# Patient Record
Sex: FEMALE | Race: WHITE | NOT HISPANIC OR LATINO | Employment: OTHER | URBAN - METROPOLITAN AREA
[De-identification: names, ages, dates, MRNs, and addresses within clinical notes are randomized per-mention and may not be internally consistent; named-entity substitution may affect disease eponyms.]

---

## 2022-03-01 ENCOUNTER — NEW PATIENT (OUTPATIENT)
Dept: URBAN - METROPOLITAN AREA CLINIC 26 | Facility: CLINIC | Age: 77
End: 2022-03-01

## 2022-03-01 VITALS
HEART RATE: 76 BPM | DIASTOLIC BLOOD PRESSURE: 102 MMHG | HEIGHT: 63 IN | BODY MASS INDEX: 28.7 KG/M2 | SYSTOLIC BLOOD PRESSURE: 156 MMHG | WEIGHT: 162 LBS

## 2022-03-01 DIAGNOSIS — H35.3221: ICD-10-CM

## 2022-03-01 DIAGNOSIS — H35.62: ICD-10-CM

## 2022-03-01 DIAGNOSIS — H35.373: ICD-10-CM

## 2022-03-01 DIAGNOSIS — H35.3112: ICD-10-CM

## 2022-03-01 DIAGNOSIS — H43.393: ICD-10-CM

## 2022-03-01 DIAGNOSIS — H35.363: ICD-10-CM

## 2022-03-01 DIAGNOSIS — H04.123: ICD-10-CM

## 2022-03-01 DIAGNOSIS — H35.443: ICD-10-CM

## 2022-03-01 PROCEDURE — 92134 CPTRZ OPH DX IMG PST SGM RTA: CPT

## 2022-03-01 PROCEDURE — 92235 FLUORESCEIN ANGRPH MLTIFRAME: CPT

## 2022-03-01 PROCEDURE — 67028 INJECTION EYE DRUG: CPT

## 2022-03-01 PROCEDURE — 99204 OFFICE O/P NEW MOD 45 MIN: CPT

## 2022-03-01 ASSESSMENT — VISUAL ACUITY
OD_SC: 20/20-1
OS_SC: 20/200
OS_PH: 20/40-1

## 2022-03-01 ASSESSMENT — TONOMETRY
OD_IOP_MMHG: 09
OS_IOP_MMHG: 10

## 2022-03-01 NOTE — PATIENT DISCUSSION
DISCUSSED THE DX, IMAGES, PROGNOSIS AND TX PLAN. PT REQUIRES TX, RISK OF VL: MOD/HIGH. ALL QUESTIONS WERE ANSWERED.

## 2022-03-01 NOTE — PROCEDURE NOTE: CLINICAL
PROCEDURE NOTE: Eylea PFS OS. Diagnosis: Neovascular AMD with Active CNV. Anesthesia: Lidocaine 4%. Prep: Betadine Drops. Prior to injection, risks/benefits/alternatives discussed including but not limited to infection, loss of vision or eye, hemorrhage, cataract, glaucoma, retinal tears or detachment. The patient wished to proceed with treatment. Betadine prep was performed. Topical anesthesia was induced with Alcaine. Additional anesthesia was achieved using drop(s) or injection checked above. A drop of Povidone-iodine 5% ophthalmic solution was instilled over the injection site and in the inferior fornix. A single use prefilled syringe of intravitreal Eylea 2mg/0.05ml was used and excess was disposed of as waste. The needle was passed 3.0 mm posterior to the limbus in pseudophakic patients, and 3.5 mm posterior to the limbus in phakic patients. Injection time: 436PM.  Patient tolerated procedure well. There were no complications. The eye was irrigated with sterile irrigating solution. Post procedure instructions given. The patient was instructed to use Artificial Tears q.i.d. p.r.n for comfort. The patient was instructed to return for re-evaluation in approximately 4-12 weeks depending on his/her condition and was told to call immediately if vision decreases and/or if his/her eye becomes red, painful, and/or light sensitive. The patient was instructed to go to the emergency room or call 911 if unable to reach the doctor within an hour or two of trying or calling. Geni Horvath

## 2022-04-06 ENCOUNTER — CLINIC PROCEDURE ONLY (OUTPATIENT)
Dept: URBAN - METROPOLITAN AREA CLINIC 26 | Facility: CLINIC | Age: 77
End: 2022-04-06

## 2022-04-06 DIAGNOSIS — H35.373: ICD-10-CM

## 2022-04-06 DIAGNOSIS — H35.3221: ICD-10-CM

## 2022-04-06 DIAGNOSIS — H35.3112: ICD-10-CM

## 2022-04-06 PROCEDURE — 92134 CPTRZ OPH DX IMG PST SGM RTA: CPT

## 2022-04-06 PROCEDURE — 67028 INJECTION EYE DRUG: CPT

## 2022-04-06 ASSESSMENT — VISUAL ACUITY
OS_SC: 20/100-1
OS_PH: 20/25-2

## 2022-04-06 ASSESSMENT — TONOMETRY: OS_IOP_MMHG: 11

## 2022-04-06 NOTE — PATIENT DISCUSSION
POST INJECTION EVALUATION, no signs of new infection, tear, RD, VF, EOM, CNS, Vascular or other problems or side effect from previous injection(s). no

## 2022-04-06 NOTE — PROCEDURE NOTE: CLINICAL
PROCEDURE NOTE: Eylea PFS OS. Diagnosis: Neovascular AMD with Active CNV. Anesthesia: Lidocaine 4%. Prep: Betadine Drops. Prior to injection, risks/benefits/alternatives discussed including but not limited to infection, loss of vision or eye, hemorrhage, cataract, glaucoma, retinal tears or detachment. The patient wished to proceed with treatment. Betadine prep was performed. Topical anesthesia was induced with Alcaine. Additional anesthesia was achieved using drop(s) or injection checked above. A drop of Povidone-iodine 5% ophthalmic solution was instilled over the injection site and in the inferior fornix. A single use prefilled syringe of intravitreal Eylea 2mg/0.05ml was used and excess was disposed of as waste. The needle was passed 3.0 mm posterior to the limbus in pseudophakic patients, and 3.5 mm posterior to the limbus in phakic patients. Injection time: 257PM.  Patient tolerated procedure well. There were no complications. The eye was irrigated with sterile irrigating solution. Post procedure instructions given. The patient was instructed to use Artificial Tears q.i.d. p.r.n for comfort. The patient was instructed to return for re-evaluation in approximately 4-12 weeks depending on his/her condition and was told to call immediately if vision decreases and/or if his/her eye becomes red, painful, and/or light sensitive. The patient was instructed to go to the emergency room or call 911 if unable to reach the doctor within an hour or two of trying or calling. Beverly Montesinos

## 2022-04-11 ENCOUNTER — EMERGENCY VISIT (OUTPATIENT)
Dept: URBAN - METROPOLITAN AREA CLINIC 33 | Facility: CLINIC | Age: 77
End: 2022-04-11

## 2022-04-11 DIAGNOSIS — R42: ICD-10-CM

## 2022-04-11 DIAGNOSIS — H43.393: ICD-10-CM

## 2022-04-11 DIAGNOSIS — H35.443: ICD-10-CM

## 2022-04-11 DIAGNOSIS — H35.3221: ICD-10-CM

## 2022-04-11 DIAGNOSIS — H35.62: ICD-10-CM

## 2022-04-11 DIAGNOSIS — H35.363: ICD-10-CM

## 2022-04-11 DIAGNOSIS — H35.373: ICD-10-CM

## 2022-04-11 DIAGNOSIS — H35.3112: ICD-10-CM

## 2022-04-11 DIAGNOSIS — H04.123: ICD-10-CM

## 2022-04-11 PROCEDURE — 92012 INTRM OPH EXAM EST PATIENT: CPT

## 2022-04-11 ASSESSMENT — TONOMETRY
OD_IOP_MMHG: 07
OS_IOP_MMHG: 08

## 2022-04-11 ASSESSMENT — VISUAL ACUITY
OS_PH: 20/30-1
OD_SC: 20/20-1
OS_SC: 20/200

## 2022-04-11 NOTE — PATIENT DISCUSSION
4/11/22: STARTED WITH DIZZINESS ONE DAY AFTER LAST INJECTION. NO EVIDENCE OF INFLAMMATION/INFECTION. RECOMMEND EVAL OF HEADACHE AND DIZZINESS WITH H/O RECENT STROKE PER PATIENT'S REPORT (JAN 2022). RECOMMEND EVAL BY NEURO/ER GIVEN PERSISTENT SX. EYE DOESN'T HURT AND THERE IS NO ACTIVE SIGNS OF ACTIVITY.

## 2022-12-21 NOTE — PATIENT DISCUSSION
Glasses Rx given. Keep the cast/splint/dressing clean and dry./Instructed patient/caregiver regarding signs and symptoms of infection./Verbal/written post procedure instructions were given to patient/caregiver.

## 2023-07-10 ENCOUNTER — APPOINTMENT (RX ONLY)
Dept: URBAN - METROPOLITAN AREA CLINIC 236 | Facility: CLINIC | Age: 78
Setting detail: DERMATOLOGY
End: 2023-07-10

## 2023-07-10 PROBLEM — C44.319 BASAL CELL CARCINOMA OF SKIN OF OTHER PARTS OF FACE: Status: ACTIVE | Noted: 2023-07-10

## 2023-07-10 PROCEDURE — ? MOHS SURGERY

## 2023-07-10 PROCEDURE — 17311 MOHS 1 STAGE H/N/HF/G: CPT

## 2023-07-10 PROCEDURE — 17312 MOHS ADDL STAGE: CPT

## 2023-07-10 NOTE — HPI: SKIN LESION (BASAL CELL CARCINOMA)
How Severe Is Your Skin Cancer?: moderate
Is This A New Presentation, Or A Follow-Up?: Basal Cell Carcinoma
When Was Basal Cell Biopsied? (Optional): 2/16/2023
Accession # (Optional): [id number]

## 2023-10-20 NOTE — PROCEDURE: MOHS SURGERY
199.9 Provider Procedure Text (A): After obtaining clear surgical margins the defect was repaired by another provider.

## 2024-02-14 ENCOUNTER — COMPREHENSIVE EXAM (OUTPATIENT)
Dept: URBAN - METROPOLITAN AREA CLINIC 26 | Facility: CLINIC | Age: 79
End: 2024-02-14

## 2024-02-14 VITALS — BODY MASS INDEX: 28.34 KG/M2 | HEIGHT: 64 IN | WEIGHT: 166 LBS

## 2024-02-14 DIAGNOSIS — H04.123: ICD-10-CM

## 2024-02-14 DIAGNOSIS — H35.443: ICD-10-CM

## 2024-02-14 DIAGNOSIS — R42: ICD-10-CM

## 2024-02-14 DIAGNOSIS — H35.373: ICD-10-CM

## 2024-02-14 DIAGNOSIS — Z96.1: ICD-10-CM

## 2024-02-14 DIAGNOSIS — H43.393: ICD-10-CM

## 2024-02-14 DIAGNOSIS — H02.831: ICD-10-CM

## 2024-02-14 DIAGNOSIS — H35.62: ICD-10-CM

## 2024-02-14 DIAGNOSIS — H35.3112: ICD-10-CM

## 2024-02-14 DIAGNOSIS — H02.834: ICD-10-CM

## 2024-02-14 DIAGNOSIS — H02.133: ICD-10-CM

## 2024-02-14 DIAGNOSIS — H35.3221: ICD-10-CM

## 2024-02-14 PROCEDURE — 92250 FUNDUS PHOTOGRAPHY W/I&R: CPT

## 2024-02-14 PROCEDURE — 67028 INJECTION EYE DRUG: CPT

## 2024-02-14 PROCEDURE — 92134 CPTRZ OPH DX IMG PST SGM RTA: CPT

## 2024-02-14 PROCEDURE — 92014 COMPRE OPH EXAM EST PT 1/>: CPT

## 2024-02-14 ASSESSMENT — VISUAL ACUITY
OD_CC: 20/20-1
OS_CC: 20/20-1

## 2024-02-14 ASSESSMENT — TONOMETRY
OD_IOP_MMHG: 10
OS_IOP_MMHG: 11

## 2024-04-25 NOTE — PROCEDURE: MOHS SURGERY
Negative Double O-Z Flap Text: The defect edges were debeveled with a #15 scalpel blade.  Given the location of the defect, shape of the defect and the proximity to free margins a Double O-Z flap was deemed most appropriate.  Using a sterile surgical marker, an appropriate transposition flap was drawn incorporating the defect and placing the expected incisions within the relaxed skin tension lines where possible. The area thus outlined was incised deep to adipose tissue with a #15 scalpel blade.  The skin margins were undermined to an appropriate distance in all directions utilizing iris scissors.

## 2024-06-20 NOTE — PATIENT DISCUSSION
Detail Level: Detailed Does NOT APPEAR VISUALLY SIGNIFICANT. Quality 130: Documentation Of Current Medications In The Medical Record: Current Medications Documented

## 2024-08-05 NOTE — PATIENT DISCUSSION
Recommended weight and BMI control through healthy diet and exercise, green leafy veggies, UV protection, and not smoking. Reviewed the benefits of AREDS II VITAMINS VERUS GENOTYPE directed vitamin therapy, and recommended following one or the other to try and prevent the progression of the disease. Reviewed the importance of daily monitoring of the vision in each eye independently, along with the use of the Amsler grid daily and instructed patient to call and return immediately for any new changes in their vision or on the Amsler grid. Patient instructed on the importance of regular follow up and monitoring for the early detection of conversion to wet AMD as early detection results in early treatment and better outcomes. Thank you for taking my call today.  All questions were answered at the time of the call, but please feel free to reach out to me via MyChart or phone, [phone], with any new questions or concerns.       We confirmed that you opted to enroll in our [id number] program so your discharge prescriptions will be available to take home at the time of discharge.  Please bring any prescription insurance coverage with you on the morning of surgery so that we can enter the information into our system.     We confirmed that your plan would be to Stay Overnight.    We confirmed that you have DME needed for recovery.     Use the provided body wash for 4 days before surgery and complete a 5th shower on the morning of surgery, this includes your body and hair.  Follow the directions as provided during preadmission testing.  The mouth wash will be used the night before and the morning of surgery.       As a reminder, if you do not hear from our team, please call [phone] between 2pm and 3pm the business day before your surgery to confirm your arrival time and details.    Please don't hesitate to reach out with additional questions or concerns.    Sabrina Trevino MBA, BSN, RN-BC  CHAUNCEY PatiñoN, RN  [facility]  [facility]  [phone]

## 2024-11-06 NOTE — PROCEDURE: MOHS SURGERY
Plastic Surgeon Procedure Text (A): After obtaining clear surgical margins the patient was sent to plastics for surgical repair.  The patient understands they will receive post-surgical care and follow-up from the referring physician's office. Holding RN to OR RN

## 2025-01-29 ENCOUNTER — FOLLOW UP (OUTPATIENT)
Age: 80
End: 2025-01-29

## 2025-01-29 DIAGNOSIS — H02.133: ICD-10-CM

## 2025-01-29 DIAGNOSIS — H35.3112: ICD-10-CM

## 2025-01-29 DIAGNOSIS — H35.443: ICD-10-CM

## 2025-01-29 DIAGNOSIS — H04.123: ICD-10-CM

## 2025-01-29 DIAGNOSIS — H35.3221: ICD-10-CM

## 2025-01-29 DIAGNOSIS — Z96.1: ICD-10-CM

## 2025-01-29 DIAGNOSIS — H02.834: ICD-10-CM

## 2025-01-29 DIAGNOSIS — H35.373: ICD-10-CM

## 2025-01-29 DIAGNOSIS — H35.62: ICD-10-CM

## 2025-01-29 DIAGNOSIS — R42: ICD-10-CM

## 2025-01-29 DIAGNOSIS — H02.831: ICD-10-CM

## 2025-01-29 DIAGNOSIS — H43.393: ICD-10-CM

## 2025-01-29 PROCEDURE — 92014 COMPRE OPH EXAM EST PT 1/>: CPT | Mod: 25

## 2025-01-29 PROCEDURE — 92134 CPTRZ OPH DX IMG PST SGM RTA: CPT

## 2025-01-29 PROCEDURE — 92250 FUNDUS PHOTOGRAPHY W/I&R: CPT | Mod: 59

## 2025-01-29 PROCEDURE — 67028 INJECTION EYE DRUG: CPT
